# Patient Record
Sex: FEMALE | Race: WHITE | Employment: FULL TIME | URBAN - METROPOLITAN AREA
[De-identification: names, ages, dates, MRNs, and addresses within clinical notes are randomized per-mention and may not be internally consistent; named-entity substitution may affect disease eponyms.]

---

## 2017-01-04 DIAGNOSIS — F51.01 PRIMARY INSOMNIA: Primary | ICD-10-CM

## 2017-01-04 RX ORDER — TEMAZEPAM 30 MG
30 CAPSULE ORAL
Qty: 30 CAPSULE | Refills: 5 | Status: CANCELLED | OUTPATIENT
Start: 2017-01-04

## 2017-01-24 ENCOUNTER — MYC MEDICAL ADVICE (OUTPATIENT)
Dept: INTERNAL MEDICINE | Facility: CLINIC | Age: 37
End: 2017-01-24

## 2017-01-24 NOTE — TELEPHONE ENCOUNTER
Called pharmacy and they stated they never got the refill in Oct.  Called in prescription now with dispense 30 with 5 refills.    Deangelo ARCEO RN

## 2017-01-26 DIAGNOSIS — F41.9 ANXIETY: Primary | ICD-10-CM

## 2017-01-26 RX ORDER — LORAZEPAM 0.5 MG/1
0.5 TABLET ORAL EVERY 8 HOURS PRN
Qty: 60 TABLET | Refills: 1 | Status: SHIPPED | OUTPATIENT
Start: 2017-01-26 | End: 2017-04-26

## 2017-01-26 NOTE — TELEPHONE ENCOUNTER
lorazepam      Last Written Prescription Date:  11/15/16  Last Fill Quantity: 60,   # refills: 1  Last Office Visit with [facility], [facility] or  Health prescribing provider: 10/5/16  Future Office visit:       Routing refill request to provider for review/approval because:  Drug not on the [facility], [facility] or  [employer] refill protocol or controlled substance

## 2017-01-27 ENCOUNTER — MYC MEDICAL ADVICE (OUTPATIENT)
Dept: INTERNAL MEDICINE | Facility: CLINIC | Age: 37
End: 2017-01-27

## 2017-03-08 ENCOUNTER — MYC MEDICAL ADVICE (OUTPATIENT)
Dept: INTERNAL MEDICINE | Facility: CLINIC | Age: 37
End: 2017-03-08

## 2017-03-08 NOTE — LETTER
[place] [address]  [facility] [address]  [phone]          March 9, 2017    RE:  America Keenan                                                                                                                                                       [address] [place] [id number]            To whom it may concern:    America Keenan is my patient. Because of low back and hip pain from sitting at her desk entire day of work, she would benefit from a standing desk which would allow her to change positions.         Sincerely,        Ebonie Camara RN, CNP

## 2017-03-21 ENCOUNTER — MYC MEDICAL ADVICE (OUTPATIENT)
Dept: INTERNAL MEDICINE | Facility: CLINIC | Age: 37
End: 2017-03-21

## 2017-03-21 DIAGNOSIS — S93.402S SPRAIN OF LEFT ANKLE, UNSPECIFIED LIGAMENT, SEQUELA: Primary | ICD-10-CM

## 2017-03-21 RX ORDER — HYDROCODONE BITARTRATE AND ACETAMINOPHEN 5; 325 MG/1; MG/1
1-2 TABLET ORAL EVERY 8 HOURS PRN
Qty: 20 TABLET | Refills: 0 | Status: SHIPPED | OUTPATIENT
Start: 2017-03-21

## 2017-04-26 DIAGNOSIS — F41.9 ANXIETY: ICD-10-CM

## 2017-04-26 RX ORDER — LORAZEPAM 0.5 MG/1
TABLET ORAL
Qty: 60 TABLET | Refills: 0 | Status: SHIPPED | OUTPATIENT
Start: 2017-04-26 | End: 2017-07-23

## 2017-06-07 ENCOUNTER — TELEPHONE (OUTPATIENT)
Dept: INTERNAL MEDICINE | Facility: CLINIC | Age: 37
End: 2017-06-07

## 2017-06-07 NOTE — TELEPHONE ENCOUNTER
Patient complains of episodes of epigastric pain that goes through to her back.  Has had these episodes sporadically for years but has now had them 2 days in a row.  Woke up 6/4/17 at midnight with the epigastric pain that radiates through to back and up into entire chest and shoulders and lasted about 30 minutes.  Had another episode 6/5/17 that lasted about 10 minutes but did not radiate into shoulders or chest.  Patient has not been able to identify a pattern related to activity level, position, meals.  She has had some nausea but no vomiting, SOB or diaphoresis.  Pain feels more sharp when she takes a deep breath in.  Denies SOB, vomiting, fever.  Has not tried taking anything OTC for symptoms like antacid.  Advised pt it is probably best to move up the appointment as she originally scheduled for next week.  Have tentatively scheduled her to see PCP tomorrow pending day care approval.  YULIANA Live R.N.

## 2017-07-23 DIAGNOSIS — F41.9 ANXIETY: ICD-10-CM

## 2017-07-23 NOTE — LETTER
[facility]    07/25/17    Patient: America Keenan  YOB: 1980  Medical Record Number: [id number]                                                                  Controlled Substance Agreement  I understand that my care provider has prescribed controlled substances (narcotics, tranquilizers, and/or stimulants) to help manage my condition(s).  I am taking this medicine to help me function or work.  I know that this is strong medicine.  It could have serious side effects and even cause a dependency on the drug.  If I stop these medicines suddenly, I could have severe withdrawal symptoms.    The risks, benefits, and side effects of these medication(s) were explained to me.  I agree that:  1. I will take part in other treatments as advised by my provider.  This may be psychiatry or counseling, physical therapy, behavioral therapy, group treatment, or a referral to a pain clinic.  I will reduce or stop my medicine when my provider tells me to do so.   2. I will take my medicines as prescribed.  I will not change the dose or schedule unless my provider tells me to.  There will be no refills if I  run out early.   I may be contacted at any time without warning and asked to complete a drug test or pill count.   3. I will keep all my appointments at the clinic.  If I miss appointments or fail to follow instructions, my provider may stop my medicine.  4. I will not ask other providers to prescribe controlled substances. And I will not accept controlled substances from other people. If I need another prescribed controlled substance for a new reason, I will notify my provider within one business day.  5. If I enroll in the [place] Medical Marijuana program, I will tell my provider.  I will also sign an agreement to share my medical records with my provider.  6. I will use one pharmacy to fill all of my controlled substance prescriptions.  If my prescription is mailed to my pharmacy, it may take 5  to 7 days for my medicine to be ready.  7. I understand that my provider, clinic care team, and pharmacy can track controlled substance prescriptions from other providers through a central database (prescription monitoring program).  8. I will bring in my list of medications (or my medicine bottles) each time I come to the clinic.  REV- 04/2016                                                                                                                                            Page 1 of 2      [facility]    07/25/17    Patient: America Keenan  YOB: 1980  Medical Record Number: [id number]    9. Refills of controlled substances will be made only during office hours.  It is up to me to make sure that I do not run out of my medicines on weekends or holidays.    10. I am responsible for my prescriptions.  If the medicine is lost or stolen, it will not be replaced.   I also agree not to share these medicines with anyone.  11. I agree to not use ANY illegal or recreational drugs.  This includes marijuana, cocaine, bath salts or other drugs.  I agree not to use alcohol unless my provider says I may.  I agree to give urine samples whenever asked.  If I fail to give a urine sample, the provider may stop my medicine.     12. I will tell my nurse or provider right away if I become pregnant or have a new medical problem treated outside of [facility].  13. I understand that this medicine can affect my thinking and judgment.  It may be unsafe for me to drive, use machinery and do dangerous tasks.  I will not do any of these things until I know how the medicine affects me.  If my dose changes, I will wait to see how it affects me.  I will contact my provider if I have concerns about medicine side effects.  I understand that if I do not follow any of the conditions above, my prescriptions or treatment may be stopped.    I agree that my provider, clinic care team, and pharmacy may work with any  city, state or federal law enforcement agency that investigates the misuse, sale, or other diversion of my controlled medicine. I will allow my provider to discuss my care with or share a copy of this agreement with any other treating provider, pharmacy or emergency room where I receive care.  I agree to give up (waive) any right of privacy or confidentiality with respect to these authorizations.   I have read this agreement and have asked questions about anything I did not understand.   ___________________________________    ___________________________  Patient Signature                                                           Date and Time  ___________________________________     ____________________________  Witness                                                                            Date and Time  ___________________________________  KLEVER Sierra CNP  REV-  04/2016                                                                                                                                                                 Page 2 of 2

## 2017-07-25 RX ORDER — LORAZEPAM 0.5 MG/1
TABLET ORAL
Qty: 60 TABLET | Refills: 0 | Status: SHIPPED | OUTPATIENT
Start: 2017-07-25 | End: 2017-10-25

## 2017-08-01 PROBLEM — Z79.899 CONTROLLED SUBSTANCE AGREEMENT SIGNED: Status: ACTIVE | Noted: 2017-08-01

## 2017-08-02 NOTE — TELEPHONE ENCOUNTER
We will wait for Ebonie Camara to review  
CSA form printed and at  for pt sig.    Rx @ [facility] triage desk--plastic bin on wall.    Will fax rx when CSA signed.    Left message for pt to call back.  
Last refill- 4/26/17-#60    Last OV-10/5/16    No CSA on file  
Left message on home voice mail to return call.  YULIANA Live R.N.    
Needs to sign CSA for refills   
Patient advised.  She will come in and sign CSA then we can fax rx to her pharmacy.  YULIANA Live R.N.    
Patient came in and signed. Please fax RX. CSA placed in Ebonie's basket to sign.  
Rx was no longer at [facility] triage basket.  Called pharmacy and rx had been faxed to them yesterday afternoon and is ready for patient to .  YULIANA Live R.N.    
valentin is in office today   
Osteoarthritis of shoulder

## 2017-08-14 ENCOUNTER — TELEPHONE (OUTPATIENT)
Dept: INTERNAL MEDICINE | Facility: CLINIC | Age: 37
End: 2017-08-14

## 2017-08-14 NOTE — TELEPHONE ENCOUNTER
Patient scheduled via MyChart with symptoms of left side chest pain, constant that radiates from front to back. Reporting it started today. Gets worse with a deep breath/ stretch. No injury or reason for pain. Hx of cancer. Pt reports due for her yearly medication check.    Called patient to triage symptoms of constant chest pain.    Attempted to contact patient, no answer, left voice message to call back.

## 2017-08-15 NOTE — TELEPHONE ENCOUNTER
Called pt, states she started an intense work out Wednesday and Thursday without a proper warm up or stretch. Friday she started having pain between her shoulder blades. Over the weekend there was no change in her pain and symptoms. Yest AM she woke up to the pain also in her chest in addition to her back. Has increased pain with deep breath, stretching, and with pushing on the area of pain.    Denies SOB, sweating, radiating arm pain.    Has been drinking fluids, resting, and prn tylenol which has helped improve the pain some.     Please advise if 08/17 appt is appropriate vs more immediate interventions, thanks.

## 2017-08-15 NOTE — TELEPHONE ENCOUNTER
I think she should be seen   There is concern about chest pain with back pain - worry for heart or pulmonary emboli     Even though it sounds mostly muscular, being checked out for chest pain would be strongly recommended   I think she should go to ER

## 2017-08-23 DIAGNOSIS — F51.01 PRIMARY INSOMNIA: ICD-10-CM

## 2017-08-23 RX ORDER — TEMAZEPAM 30 MG
CAPSULE ORAL
Qty: 30 CAPSULE | Refills: 0 | Status: SHIPPED | OUTPATIENT
Start: 2017-08-23 | End: 2017-09-25

## 2017-08-23 NOTE — TELEPHONE ENCOUNTER
[facility]      Last Written Prescription Date:  10/05/16  Last Fill Quantity: 30,   # refills: 5  Last Office Visit with [facility] or  Health prescribing provider: 10/05/16  Future Office visit:       Routing refill request to provider for review/approval because:  Drug not on the [facility] or [facility] refill protocol or controlled substance

## 2017-09-25 DIAGNOSIS — F51.01 PRIMARY INSOMNIA: ICD-10-CM

## 2017-09-25 RX ORDER — TEMAZEPAM 30 MG
CAPSULE ORAL
Qty: 30 CAPSULE | Refills: 0 | Status: SHIPPED | OUTPATIENT
Start: 2017-09-25 | End: 2017-10-26

## 2017-09-25 NOTE — TELEPHONE ENCOUNTER
Rx faxed to [facility].  Attempted to contact patient, no answer, left voice message to call back.

## 2017-10-25 DIAGNOSIS — F41.9 ANXIETY: ICD-10-CM

## 2017-10-25 NOTE — TELEPHONE ENCOUNTER
Routing refill request to provider for review/approval because:  Drug not on the [facility] refill protocol     CSA in epic.  Please advise, thanks.

## 2017-10-26 DIAGNOSIS — F51.01 PRIMARY INSOMNIA: ICD-10-CM

## 2017-10-26 RX ORDER — TEMAZEPAM 30 MG
CAPSULE ORAL
Qty: 30 CAPSULE | Refills: 1 | Status: SHIPPED | OUTPATIENT
Start: 2017-10-26

## 2017-10-26 RX ORDER — LORAZEPAM 0.5 MG/1
TABLET ORAL
Qty: 60 TABLET | Refills: 3 | Status: SHIPPED | OUTPATIENT
Start: 2017-10-26

## 2019-12-15 ENCOUNTER — HEALTH MAINTENANCE LETTER (OUTPATIENT)
Age: 39
End: 2019-12-15

## 2020-03-22 ENCOUNTER — HEALTH MAINTENANCE LETTER (OUTPATIENT)
Age: 40
End: 2020-03-22

## 2021-01-15 ENCOUNTER — HEALTH MAINTENANCE LETTER (OUTPATIENT)
Age: 41
End: 2021-01-15

## 2021-09-04 ENCOUNTER — HEALTH MAINTENANCE LETTER (OUTPATIENT)
Age: 41
End: 2021-09-04

## 2022-02-19 ENCOUNTER — HEALTH MAINTENANCE LETTER (OUTPATIENT)
Age: 42
End: 2022-02-19

## 2022-10-16 ENCOUNTER — HEALTH MAINTENANCE LETTER (OUTPATIENT)
Age: 42
End: 2022-10-16

## 2023-04-01 ENCOUNTER — HEALTH MAINTENANCE LETTER (OUTPATIENT)
Age: 43
End: 2023-04-01